# Patient Record
Sex: FEMALE | Race: OTHER | HISPANIC OR LATINO | URBAN - METROPOLITAN AREA
[De-identification: names, ages, dates, MRNs, and addresses within clinical notes are randomized per-mention and may not be internally consistent; named-entity substitution may affect disease eponyms.]

---

## 2019-08-26 ENCOUNTER — EMERGENCY (EMERGENCY)
Facility: HOSPITAL | Age: 54
LOS: 1 days | Discharge: HOME | End: 2019-08-26
Attending: EMERGENCY MEDICINE
Payer: COMMERCIAL

## 2019-08-26 VITALS
TEMPERATURE: 98 F | WEIGHT: 130.07 LBS | HEART RATE: 75 BPM | RESPIRATION RATE: 16 BRPM | DIASTOLIC BLOOD PRESSURE: 78 MMHG | OXYGEN SATURATION: 98 % | HEIGHT: 62 IN | SYSTOLIC BLOOD PRESSURE: 145 MMHG

## 2019-08-26 PROCEDURE — 73630 X-RAY EXAM OF FOOT: CPT

## 2019-08-26 PROCEDURE — 99284 EMERGENCY DEPT VISIT MOD MDM: CPT | Mod: 25

## 2019-08-26 PROCEDURE — 73610 X-RAY EXAM OF ANKLE: CPT

## 2019-08-26 PROCEDURE — 29515 APPLICATION SHORT LEG SPLINT: CPT

## 2019-08-26 PROCEDURE — 73630 X-RAY EXAM OF FOOT: CPT | Mod: 26,RT

## 2019-08-26 PROCEDURE — 99283 EMERGENCY DEPT VISIT LOW MDM: CPT | Mod: 25

## 2019-08-26 PROCEDURE — 73610 X-RAY EXAM OF ANKLE: CPT | Mod: 26,RT

## 2019-08-26 RX ORDER — IBUPROFEN 200 MG
600 TABLET ORAL ONCE
Refills: 0 | Status: COMPLETED | OUTPATIENT
Start: 2019-08-26 | End: 2019-08-26

## 2019-08-26 RX ADMIN — Medication 600 MILLIGRAM(S): at 20:33

## 2019-08-26 RX ADMIN — Medication 600 MILLIGRAM(S): at 21:10

## 2019-08-26 NOTE — ED PROCEDURE NOTE - CPROC ED POST PROC CARE GUIDE1
Instructed patient/caregiver regarding signs and symptoms of infection./Keep the cast/splint/dressing clean and dry./Elevate the injured extremity as instructed./Verbal/written post procedure instructions were given to patient/caregiver./Instructed patient/caregiver to follow-up with primary care physician.

## 2019-08-26 NOTE — ED ADULT NURSE NOTE - OBJECTIVE STATEMENT
pt is here for ankle pain/injury.  pt stated that tripped and fell, twisted right ankle, c/o right ankle pain 10/10, denied LOC or headache, pt calm at this time with family member.

## 2019-08-26 NOTE — ED PROCEDURE NOTE - ATTENDING CONTRIBUTION TO CARE
Splint applied by NP.     pt d/c by np. I eval'ed pt only on initial arrival and was available for general supervision throughout stay.

## 2019-08-26 NOTE — ED PROVIDER NOTE - CHPI ED SYMPTOMS POS
PAIN
Alcoholic cirrhosis of liver with ascites  09/27/2018    Active  Matteo Andrew
Alcoholic cirrhosis of liver with ascites  09/27/2018    Active  Matteo Andrew  Other acute gastritis  10/03/2018    Active  Rani Mckeon

## 2019-08-26 NOTE — ED PROVIDER NOTE - PROGRESS NOTE DETAILS
Weight bearing as tolerated. Xrays negative for fracture. Aircast and ACE wrap applied. Crutches provided. Will dc with podiatry clinic follow up in 1 week. Pt is well appearing walking with steady gait, stable for discharge and follow up without fail with medical doctor. I had a detailed discussion with the patient and/or guardian regarding the historical points, exam findings, and any diagnostic results supporting the discharge diagnosis. Pt educated on care and need for follow up. Strict return instructions and red flag signs and symptoms discussed with patient. Questions answered. Pt shows understanding of discharge information and agrees to follow.

## 2019-08-26 NOTE — ED PROVIDER NOTE - OBJECTIVE STATEMENT
55 y/o F with no significant PMHx/PSHx presents to the ED s/p fall with complaints of R ankle pain x today. Patient states she was getting off a bus and inverted her ankle, causing her to fall backwards. Denies head injury, tingling or any other acute complaints. Refused , prefers for daughter to translate: 55 y/o F with no significant PMHx/PSHx presents to the ED s/p fall with complaints of R ankle pain x today. Patient states she was getting off a bus and inverted her ankle, causing her to fall backwards. Denies head injury, tingling or any other acute complaints.

## 2019-08-26 NOTE — ED PROVIDER NOTE - LOWER EXTREMITY EXAM, RIGHT
lateral malleolar swelling with tenderness, no tenderness to foot, cap refill <2 seconds, R knee full range of motion without swelling or tenderness/SWELLING/LIMITED ROM/TENDERNESS

## 2019-08-26 NOTE — ED PROVIDER NOTE - CLINICAL SUMMARY MEDICAL DECISION MAKING FREE TEXT BOX
53 y/o F presents s/p fall with R ankle pain. Patient neurovascularly intact. Will obtain X-ray to rule out fracture, give ibuprofen and reassess.

## 2023-05-16 NOTE — ED ADULT NURSE NOTE - NSFALLRSKINDICATORS_ED_ALL_ED
Regarding: Anxiety  Contact: [phone]  ----- Message from Elvin Owusu sent at 5/16/2023  9:04 AM EDT -----       ----- Message from Pete Morrissey to Kendall Irvin MD sent at 5/16/2023  8:40 AM -----   My  passed away May 6. I can't sleep and struggling to plan details because I am so anxious. Can I get a RX for Xanex or something like that?  Thanks no